# Patient Record
Sex: MALE | Race: BLACK OR AFRICAN AMERICAN
[De-identification: names, ages, dates, MRNs, and addresses within clinical notes are randomized per-mention and may not be internally consistent; named-entity substitution may affect disease eponyms.]

---

## 2018-06-16 ENCOUNTER — HOSPITAL ENCOUNTER (EMERGENCY)
Dept: HOSPITAL 74 - FER | Age: 28
Discharge: HOME | End: 2018-06-16
Payer: COMMERCIAL

## 2018-06-16 VITALS — SYSTOLIC BLOOD PRESSURE: 117 MMHG | TEMPERATURE: 98.3 F | DIASTOLIC BLOOD PRESSURE: 81 MMHG | HEART RATE: 90 BPM

## 2018-06-16 VITALS — BODY MASS INDEX: 23.1 KG/M2
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
## 2018-06-16 DIAGNOSIS — S01.112A: Primary | ICD-10-CM
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
## 2018-06-16 PROCEDURE — 08QPXZZ REPAIR LEFT UPPER EYELID, EXTERNAL APPROACH: ICD-10-PCS

## 2018-06-16 NOTE — PDOC
History of Present Illness





- General


History Source: Patient


Exam Limitations: No Limitations





- History of Present Illness


Initial Comments: 





06/16/18 21:19


The patient is a 27 year old male with no significant PMH who presents to the 

emergency department with a laceration beneath the left eyebrow. The patient 

states he was playing basketball at around 5:00PM today when he accidently 

slammed into another player's head. The laceration is not actively bleeding 

during presentation. Patient states his tetanus shot is up to date. 





The patient denies LOC, nausea, vomiting, lightheadedness, double vision or 

difficulty ambulating after the incident. The patient denies any prior history 

of problems with wound healing.





Allergies: NKA


Past surgical history: None reported. 


Social history: No reported alcohol, drug, or cigarette use. 


PCP: Dr. Jaskaran Jarrett 





<Nae Bowers - Last Filed: 06/16/18 21:27>





<Missy Shaw - Last Filed: 06/17/18 05:35>





- General


Chief Complaint: Injury


Stated Complaint: LACERATION


Time Seen by Provider: 06/16/18 19:57





Past History





<Nae Bowers - Last Filed: 06/16/18 21:27>





- Past Medical History


COPD: No





- Immunization History


Td Vaccination: Yes


Immunization Up to Date: Yes





- Suicide/Smoking/Psychosocial Hx


Smoking Status: No


Smoking History: Never smoked


Number of Cigarettes Smoked Daily: 0


Cigars Per Day: 0


Hx Alcohol Use: No


Drug/Substance Use Hx: No


Substance Use Type: None





<Missy Shaw - Last Filed: 06/17/18 05:35>





- Past Medical History


Allergies/Adverse Reactions: 


 Allergies











Allergy/AdvReac Type Severity Reaction Status Date / Time


 


No Known Allergies Allergy   Verified 08/28/16 04:49











Home Medications: 


Ambulatory Orders





NK [No Known Home Medication]  08/28/16 











**Review of Systems





- Review of Systems


Able to Perform ROS?: Yes


Comments:: 





06/16/18 21:19


All systems are reviewed and negative except as noted in the HPI





<Nae Bowers - Last Filed: 06/16/18 21:27>





*Physical Exam





- Vital Signs


 Last Vital Signs











Temp Pulse Resp BP Pulse Ox


 


 98.3 F   90   16   117/81   98 


 


 06/16/18 20:00  06/16/18 20:00  06/16/18 20:00  06/16/18 20:00  06/16/18 20:00














- Physical Exam


Comments: 





06/16/18 21:19


 GENERAL: Awake, alert, and fully oriented, in no acute distress


HEAD: (+) 2cm full thickness, linear, horizontal laceration below the left 

eyebrow. 


LUNGS: Breath sounds equal, clear to auscultation bilaterally.  No wheezes, and 

no crackles


HEART: Regular rate and rhythm, normal S1 and S2, no murmurs, rubs or gallops


NEUROLOGICAL: Cranial nerves II through XII grossly intact.  Normal speech, 

normal gait


SKIN: Warm, Dry, normal turgor, no rashes noted.


 


 





<Nae Bowers - Last Filed: 06/16/18 21:27>





- Vital Signs


 Last Vital Signs











Temp Pulse Resp BP Pulse Ox


 


 98.3 F   90   16   117/81   98 


 


 06/16/18 20:00  06/16/18 20:00  06/16/18 20:00  06/16/18 20:00  06/16/18 20:00














<Missy Shaw - Last Filed: 06/17/18 05:35>





Procedures





- Laceration/Wound Repair


  ** Left Upper Eye


Wound Length: to 2.5 cm


Wound Explored: clean


Wound's Depth, Shape: linear


Irrigated w/ Saline: Yes


Betadine Prep: No (Hibiclens/)


Anesthesia: 1% Lidocaine (ethanol)


Amount of Anesthetic (ccs): 1


Wound Repaired With: Sutures


Suture Size/Type: 5:0


Number of Sutures: 4


Layer Closure: No


Sterile Dressing Applied: No


Splint Applied: No


Sling Applied: No


Progress: 





Area around left eyebrow/upper eye laceration cleansed using ethanol/Hibiclens 

solution sterilely draped.  1 mL of 1% lidocaine used for local anesthesia.  

Wound edges closely approximated and wound closed using 4 interrupted sutures 

of 5-0 nylon.  Prior to closure, wound base explored.  No evidence of muscle 

disruption or blood vessel injury.  After closure, bacitracin ointment was 

placed on the wound.  Patient tolerated procedure well





<Missy Shaw - Last Filed: 06/17/18 05:35>





Medical Decision Making





- Medical Decision Making





Documentation has been prepared under my direction and personally reviewed by 

me in its entirety. I attest that this documented accurately reflects all work, 

treatment, procedures and medical decision making performed by me.





As noted above, this 27-year-old man, otherwise healthy, presents with left 

eyebrow laceration sustained when he bumped against another player while 

playing basketball just prior to presentation.  Patient had no loss of 

consciousness and has no other associated neurologic symptoms.  There is no 

evidence of injury to the orbit; closure wound as noted above.  No evidence of 

muscle or blood vessel damage.





Patient discharged with instructions keep his head elevated overnight and keep 

wound dry for the next 2 days.  Bacitracin or Neosporin should be kept on the 

wound surface until suture removal.  Suture removal was suggested to be on 

Friday, June 22.








<Missy Shaw - Last Filed: 06/17/18 05:35>





*DC/Admit/Observation/Transfer





- Attestations


Scribe Attestion: 





06/16/18 21:21





Documentation prepared by Nae Bowers, acting as medical scribe for Missy Shaw MD.





<Nae Bowers - Last Filed: 06/16/18 21:27>





<Missy Shaw - Last Filed: 06/17/18 05:35>


Diagnosis at time of Disposition: 


Laceration of left eyebrow


Qualifiers:


 Encounter type: initial encounter Qualified Code(s): S01.112A - Laceration 

without foreign body of left eyelid and periocular area, initial encounter








- Discharge Dispostion


Disposition: HOME


Condition at time of disposition: Stable





- Referrals


Referrals: 


Jaskaran Jarrett MD [Primary Care Provider] - 





- Patient Instructions


Printed Discharge Instructions:  How to Care for a Laceration After Repair


Additional Instructions: 


Keep head elevated on extra pillow tonight





Keep wound as dry as possible for the next 48 hours





After first 2 days, can wet briefly but no immersion until sutures out





Tylenol as needed for pain for the first 2 days, then can use either Tylenol/

Motrin/Aleve





Bacitracin or Neosporin ointment to wound twice a day until sutures removed





Have sutures removed on Friday, June 22





Return or see your doctor if wound becomes more swollen or painful





- Post Discharge Activity

## 2018-06-22 ENCOUNTER — HOSPITAL ENCOUNTER (EMERGENCY)
Dept: HOSPITAL 74 - JERFT | Age: 28
Discharge: HOME | End: 2018-06-22
Payer: COMMERCIAL

## 2018-06-22 VITALS — HEART RATE: 61 BPM | SYSTOLIC BLOOD PRESSURE: 118 MMHG | DIASTOLIC BLOOD PRESSURE: 53 MMHG | TEMPERATURE: 98 F

## 2018-06-22 VITALS — BODY MASS INDEX: 21.9 KG/M2

## 2018-06-22 DIAGNOSIS — Z48.02: Primary | ICD-10-CM

## 2018-06-22 NOTE — PDOC
Suture Removal/Wound Check HPI





- History of Present Illness


Chief Complaint: Suture/Staple Removal (other)


Stated Complaint: SUTURE REMOVAL


Time Seen by Provider: 06/22/18 15:59


History Source: Yes: Patient


Treated at: [facility] ED


Date of Last ED visit: 06/16/18





- Previous ED Treatment


Type of procedure performed on last visit: Yes: Laceration Repair





Past History





- Past Medical History


Allergies/Adverse Reactions: 


 Allergies











Allergy/AdvReac Type Severity Reaction Status Date / Time


 


No Known Allergies Allergy   Verified 06/22/18 15:55











Home Medications: 


Ambulatory Orders





NK [No Known Home Medication]  08/28/16 








COPD: No


Other medical history: DENIES.





- Immunization History


Td Vaccination: Yes


Immunization Up to Date: Yes





- Suicide/Smoking/Psychosocial Hx


Smoking Status: No


Smoking History: Never smoked


Number of Cigarettes Smoked Daily: 0


Cigars Per Day: 0


Information on smoking cessation initiated: No


Hx Alcohol Use: No


Drug/Substance Use Hx: No


Substance Use Type: None





Suture Removal/Wound Check PE





- Physical Exam


Laceration/Wound Check Symptoms: denies: Pain, Fever, Chills, Redness


Location of Laceration/Wound: left: Head (well healing lac to L brow, sutures 

intact, no e/o infection)





*Review of Systems





- Review of Systems


Constitutional: No: Chills, Fever





*Physical Exam





- Vital Signs


 Last Vital Signs











Temp Pulse Resp BP Pulse Ox


 


 98 F   61   19   118/53   99 


 


 06/22/18 15:55  06/22/18 15:55  06/22/18 15:55  06/22/18 15:55  06/22/18 15:55














Medical Decision Making





- Medical Decision Making





06/22/18 16:11


28 yo M, s/p suture repair to L eyebrow lac at [facility] 7 days ago, here for 

removal. No pain, redness or fever. Pt well priyanka w/ well healing wound. 4 

sutures removed without complication


06/22/18 16:14








*DC/Admit/Observation/Transfer


Diagnosis at time of Disposition: 


 Visit for suture removal








- Discharge Dispostion


Disposition: HOME


Condition at time of disposition: Good





- Referrals





- Patient Instructions


Printed Discharge Instructions:  DI for Suture Removal





- Post Discharge Activity